# Patient Record
Sex: MALE | Race: WHITE | NOT HISPANIC OR LATINO | Employment: OTHER | URBAN - NONMETROPOLITAN AREA
[De-identification: names, ages, dates, MRNs, and addresses within clinical notes are randomized per-mention and may not be internally consistent; named-entity substitution may affect disease eponyms.]

---

## 2023-02-14 PROBLEM — R94.31 ABNORMAL EKG: Status: ACTIVE | Noted: 2023-02-14

## 2023-02-14 PROBLEM — J30.1 ALLERGIC RHINITIS DUE TO POLLEN: Status: ACTIVE | Noted: 2023-02-14

## 2023-02-14 PROBLEM — I71.21 ASCENDING AORTIC ANEURYSM (CMS-HCC): Status: ACTIVE | Noted: 2023-02-14

## 2023-02-14 PROBLEM — C61 PROSTATE CANCER (MULTI): Status: ACTIVE | Noted: 2023-02-14

## 2023-02-14 PROBLEM — R03.0 ELEVATED BLOOD PRESSURE, SITUATIONAL: Status: ACTIVE | Noted: 2023-02-14

## 2023-02-14 PROBLEM — D71 GRANULOMATOUS DISEASE (MULTI): Status: ACTIVE | Noted: 2023-02-14

## 2023-02-14 PROBLEM — R97.20 PSA ELEVATION: Status: ACTIVE | Noted: 2023-02-14

## 2023-02-14 PROBLEM — J30.9 ALLERGIC RHINITIS: Status: ACTIVE | Noted: 2023-02-14

## 2023-02-14 PROBLEM — M50.30 DEGENERATIVE DISC DISEASE, CERVICAL: Status: ACTIVE | Noted: 2023-02-14

## 2023-02-14 PROBLEM — Z86.0100 HISTORY OF COLON POLYPS: Status: ACTIVE | Noted: 2023-02-14

## 2023-02-14 PROBLEM — S76.011A STRAIN OF HIP FLEXOR, RIGHT, INITIAL ENCOUNTER: Status: ACTIVE | Noted: 2023-02-14

## 2023-02-14 PROBLEM — J45.909 AIRWAY HYPERREACTIVITY (HHS-HCC): Status: ACTIVE | Noted: 2023-02-14

## 2023-02-14 PROBLEM — S93.402A LEFT ANKLE SPRAIN: Status: ACTIVE | Noted: 2023-02-14

## 2023-02-14 PROBLEM — M16.11 ARTHRITIS OF RIGHT HIP: Status: ACTIVE | Noted: 2023-02-14

## 2023-02-14 PROBLEM — M25.551 HIP PAIN, RIGHT: Status: ACTIVE | Noted: 2023-02-14

## 2023-02-14 PROBLEM — R91.1 LUNG NODULE: Status: ACTIVE | Noted: 2023-02-14

## 2023-02-14 PROBLEM — E78.5 HYPERLIPIDEMIA: Status: ACTIVE | Noted: 2023-02-14

## 2023-02-14 PROBLEM — J98.4 RESTRICTIVE LUNG DISEASE: Status: ACTIVE | Noted: 2023-02-14

## 2023-02-14 PROBLEM — F41.9 ANXIETY: Status: ACTIVE | Noted: 2023-02-14

## 2023-02-14 PROBLEM — S61.412A LACERATION OF LEFT HAND WITHOUT FOREIGN BODY: Status: ACTIVE | Noted: 2023-02-14

## 2023-02-14 PROBLEM — Z86.010 HISTORY OF COLON POLYPS: Status: ACTIVE | Noted: 2023-02-14

## 2023-02-14 PROBLEM — M48.03 SPINAL STENOSIS OF CERVICOTHORACIC REGION: Status: ACTIVE | Noted: 2023-02-14

## 2023-02-14 RX ORDER — MONTELUKAST SODIUM 10 MG/1
10 TABLET ORAL
COMMUNITY
Start: 2022-05-25 | End: 2023-11-13 | Stop reason: WASHOUT

## 2023-02-14 RX ORDER — ATORVASTATIN CALCIUM 40 MG/1
40 TABLET, FILM COATED ORAL
COMMUNITY
Start: 2013-03-25 | End: 2023-08-07 | Stop reason: SDUPTHER

## 2023-02-14 RX ORDER — PAROXETINE HYDROCHLORIDE 20 MG/1
20 TABLET, FILM COATED ORAL
COMMUNITY
Start: 2013-03-25 | End: 2023-08-07 | Stop reason: SDUPTHER

## 2023-02-14 RX ORDER — CEPHALEXIN 500 MG/1
500 CAPSULE ORAL 3 TIMES DAILY
COMMUNITY
Start: 2022-07-21 | End: 2023-04-01

## 2023-02-14 RX ORDER — ALBUTEROL SULFATE 90 UG/1
2 AEROSOL, METERED RESPIRATORY (INHALATION) EVERY 4 HOURS PRN
COMMUNITY
Start: 2022-05-25

## 2023-02-14 RX ORDER — OMEGA-3-ACID ETHYL ESTERS 1 G/1
2 CAPSULE, LIQUID FILLED ORAL 2 TIMES DAILY
COMMUNITY
Start: 2013-04-24 | End: 2023-05-23

## 2023-02-14 RX ORDER — CHOLECALCIFEROL (VITAMIN D3) 25 MCG
1000 TABLET ORAL
COMMUNITY

## 2023-04-01 ENCOUNTER — OFFICE VISIT (OUTPATIENT)
Dept: PRIMARY CARE | Facility: CLINIC | Age: 73
End: 2023-04-01
Payer: MEDICARE

## 2023-04-01 VITALS
OXYGEN SATURATION: 98 % | TEMPERATURE: 97.4 F | WEIGHT: 182.3 LBS | BODY MASS INDEX: 27.72 KG/M2 | DIASTOLIC BLOOD PRESSURE: 68 MMHG | SYSTOLIC BLOOD PRESSURE: 144 MMHG | HEART RATE: 80 BPM

## 2023-04-01 DIAGNOSIS — C61 PROSTATE CANCER (MULTI): ICD-10-CM

## 2023-04-01 DIAGNOSIS — F41.9 ANXIETY: ICD-10-CM

## 2023-04-01 DIAGNOSIS — J30.1 ALLERGIC RHINITIS DUE TO POLLEN, UNSPECIFIED SEASONALITY: ICD-10-CM

## 2023-04-01 DIAGNOSIS — E78.2 MIXED HYPERLIPIDEMIA: ICD-10-CM

## 2023-04-01 DIAGNOSIS — D71 GRANULOMATOUS DISEASE (MULTI): ICD-10-CM

## 2023-04-01 DIAGNOSIS — Z00.00 MEDICARE ANNUAL WELLNESS VISIT, SUBSEQUENT: Primary | ICD-10-CM

## 2023-04-01 DIAGNOSIS — I71.21 ANEURYSM OF ASCENDING AORTA WITHOUT RUPTURE (CMS-HCC): ICD-10-CM

## 2023-04-01 DIAGNOSIS — R03.0 ELEVATED BLOOD PRESSURE, SITUATIONAL: ICD-10-CM

## 2023-04-01 PROCEDURE — 1160F RVW MEDS BY RX/DR IN RCRD: CPT | Performed by: FAMILY MEDICINE

## 2023-04-01 PROCEDURE — 3077F SYST BP >= 140 MM HG: CPT | Performed by: FAMILY MEDICINE

## 2023-04-01 PROCEDURE — 3078F DIAST BP <80 MM HG: CPT | Performed by: FAMILY MEDICINE

## 2023-04-01 PROCEDURE — 1036F TOBACCO NON-USER: CPT | Performed by: FAMILY MEDICINE

## 2023-04-01 PROCEDURE — 99214 OFFICE O/P EST MOD 30 MIN: CPT | Performed by: FAMILY MEDICINE

## 2023-04-01 PROCEDURE — 1159F MED LIST DOCD IN RCRD: CPT | Performed by: FAMILY MEDICINE

## 2023-04-01 ASSESSMENT — ANXIETY QUESTIONNAIRES
4. TROUBLE RELAXING: NOT AT ALL
GAD7 TOTAL SCORE: 0
IF YOU CHECKED OFF ANY PROBLEMS ON THIS QUESTIONNAIRE, HOW DIFFICULT HAVE THESE PROBLEMS MADE IT FOR YOU TO DO YOUR WORK, TAKE CARE OF THINGS AT HOME, OR GET ALONG WITH OTHER PEOPLE: NOT DIFFICULT AT ALL
5. BEING SO RESTLESS THAT IT IS HARD TO SIT STILL: NOT AT ALL
3. WORRYING TOO MUCH ABOUT DIFFERENT THINGS: NOT AT ALL
7. FEELING AFRAID AS IF SOMETHING AWFUL MIGHT HAPPEN: NOT AT ALL
2. NOT BEING ABLE TO STOP OR CONTROL WORRYING: NOT AT ALL
6. BECOMING EASILY ANNOYED OR IRRITABLE: NOT AT ALL
1. FEELING NERVOUS, ANXIOUS, OR ON EDGE: NOT AT ALL

## 2023-04-01 ASSESSMENT — PATIENT HEALTH QUESTIONNAIRE - PHQ9
SUM OF ALL RESPONSES TO PHQ9 QUESTIONS 1 & 2: 0
2. FEELING DOWN, DEPRESSED OR HOPELESS: NOT AT ALL
1. LITTLE INTEREST OR PLEASURE IN DOING THINGS: NOT AT ALL

## 2023-04-01 NOTE — PATIENT INSTRUCTIONS
1.  Elevated cholesterol    Treatment plan continue on atorvastatin.  In addition continue eating a heart healthy diet rich with fresh fruit fresh vegetables lean proteins avoiding simple sugars and fast foods.  Continue with your excellent exercise routine.    We will check kidney function liver function blood sugar and cholesterol with your next set of labs and call you with those results.  By lowering your cholesterol we are reducing your risk for heart attack and stroke and were also preventing aneurysm from getting larger    2.  Thoracic aortic aneurysm.  Your recent CT scan did indicate your aorta at 4.1.  We will continue to monitor annually.  Please check blood pressures at home.  Blood pressure morning 1 evening for 2 weeks normal.  Goals would be systolic top number consistently less than 140 preferably less than 130 bottom number diastolic consistently less than 80 by keeping the blood pressure down or preventing aneurysm from getting larger.    3.  Situational anxiety/depressed mood.  Symptoms are very well controlled on current doses of Paxil please continue on current doses of paroxetine and continue with a healthy relationship with your family and friends.  Try to be outside for 30 minutes to Sunlight every day.  Continue with sleep prior to sleep at night.  And continue with daily pressure medication.  If you think your moods are changing or worsening please call me    4.  Prostate cancer.  We will continue to monitor your PSA after your prostate cancer treatment    5.  If you otherwise stay healthy and labs are normal we should do a Medicare wellness in 6 months but am happy to see you sooner if needed

## 2023-04-05 NOTE — PROGRESS NOTES
Subjective   Jai Mcneill is a 73 y.o. male who presents for Follow-up (6 month follow up htn, chol, discuss mri, ekg, aortic aneurysm /).  ARMIDA Vergara is here today for follow-up.  We had a good discussion regards to recent CT scan.  We discussed the results in detail.    He currently exercises on a regular basis denying any chest pain chest pressure chest tightness with activities.    He continues to take atorvastatin to help lower cholesterol.  And he takes Lovaza to help lower triglycerides.  He denies side effects.  He continues to try to eat a healthy diet.    He continues to take Paxil to help with overall mood.  He denies any feelings of anxiety or depression.  He continues to use albuterol as needed as well as Singulair to help reduce sinus drainage and coughing.      Review of Systems   All other systems reviewed and are negative.      Objective   /68 (BP Location: Right arm, Patient Position: Sitting, BP Cuff Size: Adult)   Pulse 80   Temp 36.3 °C (97.4 °F) (Temporal)   Wt 82.7 kg (182 lb 4.8 oz)   SpO2 98%   BMI 27.72 kg/m²    Physical Exam  Vitals reviewed.   Constitutional:       Appearance: Normal appearance. He is normal weight.   HENT:      Head: Normocephalic and atraumatic.      Right Ear: Tympanic membrane, ear canal and external ear normal.      Left Ear: Tympanic membrane, ear canal and external ear normal.      Nose: Nose normal.      Mouth/Throat:      Mouth: Mucous membranes are moist.      Pharynx: Oropharynx is clear.   Eyes:      Extraocular Movements: Extraocular movements intact.      Conjunctiva/sclera: Conjunctivae normal.      Pupils: Pupils are equal, round, and reactive to light.   Neck:      Vascular: No carotid bruit.   Cardiovascular:      Rate and Rhythm: Normal rate and regular rhythm.      Pulses: Normal pulses.      Heart sounds: Normal heart sounds. No murmur heard.  Pulmonary:      Effort: Pulmonary effort is normal. No respiratory distress.      Breath  sounds: Normal breath sounds. No wheezing, rhonchi or rales.   Abdominal:      General: Abdomen is flat. Bowel sounds are normal.      Palpations: Abdomen is soft. There is no mass.      Tenderness: There is no abdominal tenderness. There is no guarding.   Musculoskeletal:         General: No swelling or deformity. Normal range of motion.      Cervical back: Normal range of motion and neck supple.      Right lower leg: No edema.      Left lower leg: No edema.   Lymphadenopathy:      Cervical: No cervical adenopathy.   Skin:     General: Skin is warm and dry.      Capillary Refill: Capillary refill takes less than 2 seconds.   Neurological:      General: No focal deficit present.      Mental Status: He is alert and oriented to person, place, and time.   Psychiatric:         Mood and Affect: Mood normal.         Behavior: Behavior normal.         Thought Content: Thought content normal.         Judgment: Judgment normal.         Assessment/Plan   1.  Elevated cholesterol    Treatment plan continue on atorvastatin.  In addition continue eating a heart healthy diet rich with fresh fruit fresh vegetables lean proteins avoiding simple sugars and fast foods.  Continue with your excellent exercise routine.    We will check kidney function liver function blood sugar and cholesterol with your next set of labs and call you with those results.  By lowering your cholesterol we are reducing your risk for heart attack and stroke and were also preventing aneurysm from getting larger    2.  Thoracic aortic aneurysm.  Your recent CT scan did indicate your aorta at 4.1.  We will continue to monitor annually.  Please check blood pressures at home.  Blood pressure morning 1 evening for 2 weeks normal.  Goals would be systolic top number consistently less than 140 preferably less than 130 bottom number diastolic consistently less than 80 by keeping the blood pressure down or preventing aneurysm from getting larger.    3.  Situational  anxiety/depressed mood.  Symptoms are very well controlled on current doses of Paxil please continue on current doses of paroxetine and continue with a healthy relationship with your family and friends.  Try to be outside for 30 minutes to Sunlight every day.  Continue with sleep prior to sleep at night.  And continue with daily pressure medication.  If you think your moods are changing or worsening please call me    4.  Prostate cancer.  We will continue to monitor your PSA after your prostate cancer treatment    5.  If you otherwise stay healthy and labs are normal we should do a Medicare wellness in 6 months but am happy to see you sooner if needed      Problem List Items Addressed This Visit          Circulatory    Ascending aortic aneurysm (CMS/HCC)    Elevated blood pressure, situational       Genitourinary    Prostate cancer (CMS/HCC)    Relevant Orders    PSA       Infectious/Inflammatory    Granulomatous disease (CMS/HCC)       Other    Allergic rhinitis due to pollen    Anxiety    Hyperlipidemia    Relevant Orders    Comprehensive Metabolic Panel    Lipid Panel     Other Visit Diagnoses       Medicare annual wellness visit, subsequent    -  Primary    Relevant Orders    Follow Up In [facility] - PCP

## 2023-05-23 DIAGNOSIS — E78.5 HYPERLIPIDEMIA, UNSPECIFIED HYPERLIPIDEMIA TYPE: ICD-10-CM

## 2023-05-23 RX ORDER — OMEGA-3-ACID ETHYL ESTERS 1 G/1
CAPSULE, LIQUID FILLED ORAL
Qty: 360 CAPSULE | Refills: 0 | Status: SHIPPED | OUTPATIENT
Start: 2023-05-23 | End: 2023-11-27 | Stop reason: SDUPTHER

## 2023-08-07 DIAGNOSIS — F41.9 ANXIETY AND DEPRESSION: ICD-10-CM

## 2023-08-07 DIAGNOSIS — E78.5 HYPERLIPIDEMIA, UNSPECIFIED HYPERLIPIDEMIA TYPE: ICD-10-CM

## 2023-08-07 DIAGNOSIS — F32.A ANXIETY AND DEPRESSION: ICD-10-CM

## 2023-08-07 RX ORDER — PAROXETINE HYDROCHLORIDE 20 MG/1
20 TABLET, FILM COATED ORAL
Qty: 90 TABLET | Refills: 3 | Status: SHIPPED | OUTPATIENT
Start: 2023-08-07

## 2023-08-07 RX ORDER — ATORVASTATIN CALCIUM 40 MG/1
40 TABLET, FILM COATED ORAL
Qty: 90 TABLET | Refills: 3 | Status: SHIPPED | OUTPATIENT
Start: 2023-08-07

## 2023-10-07 ENCOUNTER — APPOINTMENT (OUTPATIENT)
Dept: PRIMARY CARE | Facility: CLINIC | Age: 73
End: 2023-10-07
Payer: MEDICARE

## 2023-11-13 ENCOUNTER — OFFICE VISIT (OUTPATIENT)
Dept: PRIMARY CARE | Facility: CLINIC | Age: 73
End: 2023-11-13
Payer: MEDICARE

## 2023-11-13 VITALS
OXYGEN SATURATION: 98 % | HEIGHT: 70 IN | DIASTOLIC BLOOD PRESSURE: 76 MMHG | SYSTOLIC BLOOD PRESSURE: 148 MMHG | HEART RATE: 60 BPM | TEMPERATURE: 97 F | WEIGHT: 181.1 LBS | BODY MASS INDEX: 25.93 KG/M2

## 2023-11-13 DIAGNOSIS — E55.9 VITAMIN D DEFICIENCY: ICD-10-CM

## 2023-11-13 DIAGNOSIS — I71.21 ANEURYSM OF ASCENDING AORTA WITHOUT RUPTURE (CMS-HCC): ICD-10-CM

## 2023-11-13 DIAGNOSIS — M50.30 DEGENERATIVE DISC DISEASE, CERVICAL: ICD-10-CM

## 2023-11-13 DIAGNOSIS — C61 PROSTATE CANCER (MULTI): ICD-10-CM

## 2023-11-13 DIAGNOSIS — J30.89 ALLERGIC RHINITIS DUE TO OTHER ALLERGIC TRIGGER, UNSPECIFIED SEASONALITY: ICD-10-CM

## 2023-11-13 DIAGNOSIS — Z12.11 ENCOUNTER FOR SCREENING FOR MALIGNANT NEOPLASM OF COLON: ICD-10-CM

## 2023-11-13 DIAGNOSIS — R03.0 ELEVATED BLOOD PRESSURE, SITUATIONAL: ICD-10-CM

## 2023-11-13 DIAGNOSIS — Z23 IMMUNIZATION DUE: ICD-10-CM

## 2023-11-13 DIAGNOSIS — E78.5 HYPERLIPIDEMIA, UNSPECIFIED HYPERLIPIDEMIA TYPE: ICD-10-CM

## 2023-11-13 DIAGNOSIS — D64.9 ANEMIA, UNSPECIFIED TYPE: ICD-10-CM

## 2023-11-13 DIAGNOSIS — F41.9 ANXIETY: ICD-10-CM

## 2023-11-13 DIAGNOSIS — Z00.00 MEDICARE ANNUAL WELLNESS VISIT, SUBSEQUENT: Primary | ICD-10-CM

## 2023-11-13 PROCEDURE — G0009 ADMIN PNEUMOCOCCAL VACCINE: HCPCS | Performed by: FAMILY MEDICINE

## 2023-11-13 PROCEDURE — 3078F DIAST BP <80 MM HG: CPT | Performed by: FAMILY MEDICINE

## 2023-11-13 PROCEDURE — 1170F FXNL STATUS ASSESSED: CPT | Performed by: FAMILY MEDICINE

## 2023-11-13 PROCEDURE — 1160F RVW MEDS BY RX/DR IN RCRD: CPT | Performed by: FAMILY MEDICINE

## 2023-11-13 PROCEDURE — 99214 OFFICE O/P EST MOD 30 MIN: CPT | Performed by: FAMILY MEDICINE

## 2023-11-13 PROCEDURE — 1159F MED LIST DOCD IN RCRD: CPT | Performed by: FAMILY MEDICINE

## 2023-11-13 PROCEDURE — 1036F TOBACCO NON-USER: CPT | Performed by: FAMILY MEDICINE

## 2023-11-13 PROCEDURE — G0439 PPPS, SUBSEQ VISIT: HCPCS | Performed by: FAMILY MEDICINE

## 2023-11-13 PROCEDURE — 3077F SYST BP >= 140 MM HG: CPT | Performed by: FAMILY MEDICINE

## 2023-11-13 PROCEDURE — 90677 PCV20 VACCINE IM: CPT | Performed by: FAMILY MEDICINE

## 2023-11-13 PROCEDURE — 1125F AMNT PAIN NOTED PAIN PRSNT: CPT | Performed by: FAMILY MEDICINE

## 2023-11-13 ASSESSMENT — ENCOUNTER SYMPTOMS
NEUROLOGICAL NEGATIVE: 1
ENDOCRINE NEGATIVE: 1
CONSTITUTIONAL NEGATIVE: 1
GASTROINTESTINAL NEGATIVE: 1
HEMATOLOGIC/LYMPHATIC NEGATIVE: 1
MUSCULOSKELETAL NEGATIVE: 1
PSYCHIATRIC NEGATIVE: 1
EYES NEGATIVE: 1
CARDIOVASCULAR NEGATIVE: 1
RESPIRATORY NEGATIVE: 1

## 2023-11-13 ASSESSMENT — ACTIVITIES OF DAILY LIVING (ADL)
BATHING: INDEPENDENT
TAKING_MEDICATION: INDEPENDENT
GROCERY_SHOPPING: INDEPENDENT
MANAGING_FINANCES: INDEPENDENT
DOING_HOUSEWORK: INDEPENDENT
DRESSING: INDEPENDENT

## 2023-11-13 NOTE — PATIENT INSTRUCTIONS
1.  Medicare wellness visit    Today in the office you had your annual Medicare wellness visit    You are up-to-date with your vaccines you received a Prevnar 20 pneumonia vaccine today    Please keep eating a heart healthy diet a good goal is 5-7 servings of fresh fruit and vegetable every day along with lean protein avoiding simple sugars and fast foods    Continue with your excellent exercise routine exercising 30 to 60 minutes 5 days a week is ideal.  Certainly would call if any chest pain chest pressure chest tightness with your activities    Continue on the atorvastatin 40 mg a day and your omega-3 fish oil by lowering your cholesterol and your triglycerides you are reducing risk for heart attack and stroke    With your next set of labs we will check kidney function liver function blood sugar cholesterol we will also check a PSA with your history of prostate cancer.  In my opinion it is safe to follow your PSAs providing that your urinary symptoms do not change dramatically if you start having obstructive symptoms such as you cannot urinate this could be a reason to contact me or the urologist.  Certainly it is reasonable to continue following with the urologist if you would like with your history of prostate disease    Continue on the paroxetine 20 mg a day to help with your overall mood continue getting at least 6 hours of sleep at night.  Continue getting outside for least 30 minutes and natural sunlight every day.  Continue with a healthy relationship with family and friends.  And continue with daily prayer meditation if you feel like your moods are changing or worsening please call  You had a severe reaction to bee stings.  Please carry Benadryl at all times.  Due to the severity of the reaction I am recommending you carry an EpiPen if you start having shortness of breath lightheadedness you feel like you are going to pass out please give yourself an EpiPen or have someone around you get the EpiPen.  If  you get the EpiPen you must call 911 is the EpiPen will wear off and you could go back in anaphylaxis.  If you would like to meet with one of our allergist to determine the type of bee you are allergic to and if you would be eligible treatment for your allergy please let me know.  If you otherwise stay healthy I would like to see him back in 6 months to repeat but am happy to see sooner if needed

## 2023-11-13 NOTE — PROGRESS NOTES
"Subjective   Patient ID: Jai Mcneill is a 73 y.o. male who presents for Medicare Annual Wellness Visit Subsequent ([facility]).    HPI     The patient reports needing allergy medication for hiking. The patient is worried about allergy to bug bites and bee stings. The patient is wondering if an epipen is reasonable to have for allergic reactions to these insects.     The patient had an aneurysm of the aorta in 02/2023. Since then the patient has not followed up since then and is wondering when he should follow up.    Hyperlipidemia: The patient condition is stable. The patient is currently compliant with their current medication regimen and are tolerating it appropriately. There are no further concerns at this time.    The patient denies any changes in vision, hearing or dental.     The patient maintains they do not have any chest pain, chest tightness or shortness of breath.    They do not experience nausea, emesis, changes in bowel movements or dyspepsia.    Review of Systems   Constitutional: Negative.    HENT: Negative.     Eyes: Negative.    Respiratory: Negative.     Cardiovascular: Negative.    Gastrointestinal: Negative.    Endocrine: Negative.    Genitourinary: Negative.    Musculoskeletal: Negative.    Skin: Negative.    Allergic/Immunologic: Positive for environmental allergies.   Neurological: Negative.    Hematological: Negative.    Psychiatric/Behavioral: Negative.         Objective   /76 (BP Location: Right arm, Patient Position: Sitting, BP Cuff Size: Adult)   Pulse 60   Temp 36.1 °C (97 °F) (Temporal)   Ht 1.772 m (5' 9.75\")   Wt 82.1 kg (181 lb 1.6 oz)   SpO2 98%   BMI 26.17 kg/m²     Physical Exam  Constitutional:       Appearance: Normal appearance.   HENT:      Head: Normocephalic and atraumatic.      Nose: Nose normal.   Eyes:      Extraocular Movements: Extraocular movements intact.      Conjunctiva/sclera: Conjunctivae normal.      Pupils: Pupils are equal, round, and reactive to " light.   Cardiovascular:      Rate and Rhythm: Normal rate and regular rhythm.      Pulses: Normal pulses.      Heart sounds: Normal heart sounds.   Pulmonary:      Effort: Pulmonary effort is normal.      Breath sounds: Normal breath sounds and air entry.   Abdominal:      General: Bowel sounds are normal.      Palpations: Abdomen is soft.   Musculoskeletal:         General: Normal range of motion.      Cervical back: Normal range of motion.   Neurological:      Mental Status: He is alert.   Psychiatric:         Mood and Affect: Mood normal.         Behavior: Behavior normal.         Thought Content: Thought content normal.         Judgment: Judgment normal.         Assessment/Plan   Problem List Items Addressed This Visit             ICD-10-CM    Allergic rhinitis J30.9    Anxiety F41.9    Ascending aortic aneurysm (CMS/[facility]) I71.21    Degenerative disc disease, cervical M50.30    Elevated blood pressure, situational R03.0    Hyperlipidemia E78.5    Prostate cancer (CMS/[facility]) C61    Relevant Orders    CBC and Auto Differential    PSA    Medicare annual wellness visit, subsequent - Primary Z00.00    Relevant Orders    CBC and Auto Differential    Tsh With Reflex To Free T4 If Abnormal    Hemoglobin A1c    Vitamin D 25-Hydroxy,Total (for eval of Vitamin D levels)    Follow Up In [facility] - PCP - Health Maintenance    Anemia D64.9    Relevant Orders    CBC and Auto Differential    Vitamin D deficiency E55.9    Relevant Orders    Vitamin D 25-Hydroxy,Total (for eval of Vitamin D levels)     Other Visit Diagnoses         Codes    Encounter for screening for malignant neoplasm of colon     Z12.11    Relevant Orders    Colonoscopy Screening; Average Risk Patient    Immunization due     Z23    Relevant Orders    Pneumococcal conjugate vaccine, 20-valent (PREVNAR 20) (Completed)          1. Medicare annual wellness visit, subsequent  CBC and Auto Differential    Tsh With Reflex To Free T4 If Abnormal     Hemoglobin A1c    Vitamin D 25-Hydroxy,Total (for eval of Vitamin D levels)    Follow Up In [facility] - PCP    Follow Up In [facility] - PCP - Health Maintenance      2. Encounter for screening for malignant neoplasm of colon  Colonoscopy Screening; Average Risk Patient      3. Hyperlipidemia, unspecified hyperlipidemia type        4. Immunization due  Pneumococcal conjugate vaccine, 20-valent (PREVNAR 20)      5. Anemia, unspecified type  CBC and Auto Differential      6. Vitamin D deficiency  Vitamin D 25-Hydroxy,Total (for eval of Vitamin D levels)      7. Prostate cancer (CMS/HCC)  CBC and Auto Differential    PSA      8. Elevated blood pressure, situational        9. Aneurysm of ascending aorta without rupture (CMS/HCC)        10. Allergic rhinitis due to other allergic trigger, unspecified seasonality        11. Anxiety        12. Degenerative disc disease, cervical          1.  Medicare wellness visit    Today in the office you had your annual Medicare wellness visit    You are up-to-date with your vaccines you received a Prevnar 20 pneumonia vaccine today    Please keep eating a heart healthy diet a good goal is 5-7 servings of fresh fruit and vegetable every day along with lean protein avoiding simple sugars and fast foods    Continue with your excellent exercise routine exercising 30 to 60 minutes 5 days a week is ideal.  Certainly would call if any chest pain chest pressure chest tightness with your activities    Continue on the atorvastatin 40 mg a day and your omega-3 fish oil by lowering your cholesterol and your triglycerides you are reducing risk for heart attack and stroke    With your next set of labs we will check kidney function liver function blood sugar cholesterol we will also check a PSA with your history of prostate cancer.  In my opinion it is safe to follow your PSAs providing that your urinary symptoms do not change dramatically if you start having obstructive  symptoms such as you cannot urinate this could be a reason to contact me or the urologist.  Certainly it is reasonable to continue following with the urologist if you would like with your history of prostate disease    Continue on the paroxetine 20 mg a day to help with your overall mood continue getting at least 6 hours of sleep at night.  Continue getting outside for least 30 minutes and natural sunlight every day.  Continue with a healthy relationship with family and friends.  And continue with daily prayer meditation if you feel like your moods are changing or worsening please call  You had a severe reaction to bee stings.  Please carry Benadryl at all times.  Due to the severity of the reaction I am recommending you carry an EpiPen if you start having shortness of breath lightheadedness you feel like you are going to pass out please give yourself an EpiPen or have someone around you get the EpiPen.  If you get the EpiPen you must call 911 is the EpiPen will wear off and you could go back in anaphylaxis.  If you would like to meet with one of our allergist to determine the type of bee you are allergic to and if you would be eligible treatment for your allergy please let me know.  If you otherwise stay healthy I would like to see him back in 6 months to repeat but am happy to see sooner if needed     Follow-up in 6 months or sooner if there are any concerns.    Scribe Attestation  By signing my name below, Louise VANG, Noe   attest that this documentation has been prepared under the direction and in the presence of Nabeel Elliott MD on 11/13/2023 at1:50pm EST.

## 2023-11-15 PROBLEM — D64.9 ANEMIA: Status: ACTIVE | Noted: 2023-11-15

## 2023-11-15 PROBLEM — E55.9 VITAMIN D DEFICIENCY: Status: ACTIVE | Noted: 2023-11-15

## 2023-11-15 PROBLEM — Z00.00 MEDICARE ANNUAL WELLNESS VISIT, SUBSEQUENT: Status: ACTIVE | Noted: 2023-11-15

## 2023-11-27 DIAGNOSIS — E78.5 HYPERLIPIDEMIA, UNSPECIFIED HYPERLIPIDEMIA TYPE: ICD-10-CM

## 2023-11-27 RX ORDER — OMEGA-3-ACID ETHYL ESTERS 1 G/1
2 CAPSULE, LIQUID FILLED ORAL 2 TIMES DAILY
Qty: 360 CAPSULE | Refills: 3 | Status: SHIPPED | OUTPATIENT
Start: 2023-11-27

## 2023-12-04 ENCOUNTER — TELEPHONE (OUTPATIENT)
Dept: PRIMARY CARE | Facility: CLINIC | Age: 73
End: 2023-12-04
Payer: MEDICARE

## 2023-12-04 DIAGNOSIS — U07.1 COVID-19: Primary | ICD-10-CM

## 2023-12-04 NOTE — TELEPHONE ENCOUNTER
Onset of Covid illness- 12/1  Date & Location of positive covid 19 test- 12/4 at home  (If negative test please list details of exposure and why requesting MOAB treatment )  Current pregnancy -  no  Vaccinated for covid 19- yes up to date on all  Current Symptoms  - body aches, congestion, coughing  Temperature- did not take, does feel warm      I have asked patient and they report they have NO increased SOB, No CP, No confusion, No change in skin color ( blue or gray).  Next available virtual opening is - 12/5    Provider if you can please review patient's chart and medical history and provide detailed instructions on next steps in this task.   Thank you    Bree in [place] (remove from chart after message since this would only be for antiviral medication not routine pharmacy)

## 2023-12-04 NOTE — TELEPHONE ENCOUNTER
I spoke with the patient on the phone.  I prescribed Paxlovid.  He is aware he needs to stop the atorvastatin while taking Paxlovid.  He will push fluids.  Contact me if he worsens.

## 2024-05-13 ENCOUNTER — APPOINTMENT (OUTPATIENT)
Dept: PRIMARY CARE | Facility: CLINIC | Age: 74
End: 2024-05-13
Payer: MEDICARE

## 2024-07-03 ASSESSMENT — PROMIS GLOBAL HEALTH SCALE
CARRYOUT_SOCIAL_ACTIVITIES: EXCELLENT
RATE_MENTAL_HEALTH: EXCELLENT
RATE_AVERAGE_PAIN: 0
RATE_PHYSICAL_HEALTH: VERY GOOD
RATE_AVERAGE_FATIGUE: MILD
EMOTIONAL_PROBLEMS: NEVER
RATE_QUALITY_OF_LIFE: EXCELLENT
RATE_GENERAL_HEALTH: VERY GOOD
RATE_SOCIAL_SATISFACTION: EXCELLENT
CARRYOUT_PHYSICAL_ACTIVITIES: COMPLETELY

## 2024-07-05 ENCOUNTER — APPOINTMENT (OUTPATIENT)
Dept: PRIMARY CARE | Facility: CLINIC | Age: 74
End: 2024-07-05
Payer: MEDICARE

## 2024-07-05 VITALS
OXYGEN SATURATION: 97 % | HEART RATE: 75 BPM | TEMPERATURE: 97.5 F | BODY MASS INDEX: 25.86 KG/M2 | HEIGHT: 70 IN | SYSTOLIC BLOOD PRESSURE: 138 MMHG | DIASTOLIC BLOOD PRESSURE: 80 MMHG | WEIGHT: 180.6 LBS

## 2024-07-05 DIAGNOSIS — Z00.00 MEDICARE ANNUAL WELLNESS VISIT, SUBSEQUENT: ICD-10-CM

## 2024-07-05 DIAGNOSIS — F41.9 ANXIETY AND DEPRESSION: ICD-10-CM

## 2024-07-05 DIAGNOSIS — F32.A ANXIETY AND DEPRESSION: ICD-10-CM

## 2024-07-05 DIAGNOSIS — R03.0 ELEVATED BLOOD PRESSURE, SITUATIONAL: Primary | ICD-10-CM

## 2024-07-05 DIAGNOSIS — D71 GRANULOMATOUS DISEASE (MULTI): ICD-10-CM

## 2024-07-05 DIAGNOSIS — I71.21 ANEURYSM OF ASCENDING AORTA WITHOUT RUPTURE (CMS-HCC): ICD-10-CM

## 2024-07-05 DIAGNOSIS — C61 PROSTATE CANCER (MULTI): ICD-10-CM

## 2024-07-05 DIAGNOSIS — E78.5 HYPERLIPIDEMIA, UNSPECIFIED HYPERLIPIDEMIA TYPE: ICD-10-CM

## 2024-07-05 PROCEDURE — 1170F FXNL STATUS ASSESSED: CPT | Performed by: FAMILY MEDICINE

## 2024-07-05 PROCEDURE — 1159F MED LIST DOCD IN RCRD: CPT | Performed by: FAMILY MEDICINE

## 2024-07-05 PROCEDURE — 1160F RVW MEDS BY RX/DR IN RCRD: CPT | Performed by: FAMILY MEDICINE

## 2024-07-05 PROCEDURE — 3078F DIAST BP <80 MM HG: CPT | Performed by: FAMILY MEDICINE

## 2024-07-05 PROCEDURE — 1123F ACP DISCUSS/DSCN MKR DOCD: CPT | Performed by: FAMILY MEDICINE

## 2024-07-05 PROCEDURE — 3075F SYST BP GE 130 - 139MM HG: CPT | Performed by: FAMILY MEDICINE

## 2024-07-05 PROCEDURE — 99214 OFFICE O/P EST MOD 30 MIN: CPT | Performed by: FAMILY MEDICINE

## 2024-07-05 RX ORDER — ATORVASTATIN CALCIUM 40 MG/1
40 TABLET, FILM COATED ORAL
Qty: 90 TABLET | Refills: 3 | Status: SHIPPED | OUTPATIENT
Start: 2024-07-05

## 2024-07-05 RX ORDER — PAROXETINE HYDROCHLORIDE 20 MG/1
20 TABLET, FILM COATED ORAL
Qty: 90 TABLET | Refills: 3 | Status: SHIPPED | OUTPATIENT
Start: 2024-07-05

## 2024-07-05 RX ORDER — CYANOCOBALAMIN (VITAMIN B-12) 250 MCG
250 TABLET ORAL DAILY
COMMUNITY

## 2024-07-05 RX ORDER — MV-MN/LUTEIN/ZEAX/BILBER/HB277 5-1-7.5 MG
CAPSULE ORAL
COMMUNITY

## 2024-07-05 ASSESSMENT — ENCOUNTER SYMPTOMS
CONSTIPATION: 0
EYES NEGATIVE: 1
ENDOCRINE NEGATIVE: 1
DIARRHEA: 0
GASTROINTESTINAL NEGATIVE: 1
ALLERGIC/IMMUNOLOGIC NEGATIVE: 1
NEUROLOGICAL NEGATIVE: 1
MUSCULOSKELETAL NEGATIVE: 1
CHEST TIGHTNESS: 0
SHORTNESS OF BREATH: 0
PSYCHIATRIC NEGATIVE: 1
RESPIRATORY NEGATIVE: 1
NAUSEA: 0
VOMITING: 0
CARDIOVASCULAR NEGATIVE: 1
HEMATOLOGIC/LYMPHATIC NEGATIVE: 1
CONSTITUTIONAL NEGATIVE: 1

## 2024-07-05 ASSESSMENT — PATIENT HEALTH QUESTIONNAIRE - PHQ9
2. FEELING DOWN, DEPRESSED OR HOPELESS: NOT AT ALL
1. LITTLE INTEREST OR PLEASURE IN DOING THINGS: NOT AT ALL
SUM OF ALL RESPONSES TO PHQ9 QUESTIONS 1 AND 2: 0

## 2024-07-05 ASSESSMENT — ACTIVITIES OF DAILY LIVING (ADL)
TAKING_MEDICATION: INDEPENDENT
DOING_HOUSEWORK: INDEPENDENT
GROCERY_SHOPPING: INDEPENDENT
BATHING: INDEPENDENT
MANAGING_FINANCES: INDEPENDENT
DRESSING: INDEPENDENT

## 2024-07-05 NOTE — PROGRESS NOTES
"Subjective   Patient ID: Jai Mcneill is a 74 y.o. male who presents for Follow-up (6 mo fuv chol, discuss omega 3 not covered no other issues).    HPI     The patient saw his optometrist a few days ago.     The patient denies any changes in vision, hearing or dental.     The patient maintains they do not have any chest pain, chest tightness or shortness of breath.    They do not experience nausea, emesis, changes in bowel movements or dyspepsia.    The patient denies nocturia. They report urinating 1 and 2 times a night. The nocturia does not cause sleep disturbances.     The patient's colonoscopy is up to date.    The patient's vaccinations are up to date.      Review of Systems   Constitutional: Negative.    HENT: Negative.  Negative for dental problem and hearing loss.    Eyes: Negative.  Negative for visual disturbance.   Respiratory: Negative.  Negative for chest tightness and shortness of breath.    Cardiovascular: Negative.  Negative for chest pain.   Gastrointestinal: Negative.  Negative for constipation, diarrhea, nausea and vomiting.   Endocrine: Negative.    Genitourinary: Negative.    Musculoskeletal: Negative.    Skin: Negative.    Allergic/Immunologic: Negative.    Neurological: Negative.    Hematological: Negative.    Psychiatric/Behavioral: Negative.         Objective   /80   Pulse 75   Temp 36.4 °C (97.5 °F) (Temporal)   Ht 1.765 m (5' 9.5\")   Wt 81.9 kg (180 lb 9.6 oz)   SpO2 97%   BMI 26.29 kg/m²     Physical Exam  Constitutional:       Appearance: Normal appearance.   HENT:      Head: Normocephalic and atraumatic.      Nose: Nose normal.   Eyes:      Extraocular Movements: Extraocular movements intact.      Conjunctiva/sclera: Conjunctivae normal.      Pupils: Pupils are equal, round, and reactive to light.   Cardiovascular:      Rate and Rhythm: Normal rate and regular rhythm.      Pulses: Normal pulses.      Heart sounds: Normal heart sounds.   Pulmonary:      Effort: Pulmonary " effort is normal.      Breath sounds: Normal breath sounds and air entry.   Abdominal:      General: Bowel sounds are normal.      Palpations: Abdomen is soft.   Musculoskeletal:         General: Normal range of motion.      Cervical back: Normal range of motion.   Neurological:      Mental Status: He is alert.   Psychiatric:         Mood and Affect: Mood normal.         Behavior: Behavior normal.         Thought Content: Thought content normal.         Judgment: Judgment normal.         Assessment/Plan   Problem List Items Addressed This Visit             ICD-10-CM    Anxiety and depression F41.9, F32.A    Relevant Medications    PARoxetine (Paxil) 20 mg tablet    Ascending aortic aneurysm (CMS-HCC) I71.21    Elevated blood pressure, situational - Primary R03.0    Granulomatous disease (Multi) D71    Hyperlipidemia E78.5    Relevant Medications    atorvastatin (Lipitor) 40 mg tablet    Other Relevant Orders    Lipid Panel    Prostate cancer (Multi) C61    Medicare annual wellness visit, subsequent Z00.00    Relevant Orders    Follow Up In [facility] - [facility] - Medicare Annual          1. Elevated blood pressure, situational        2. Medicare annual wellness visit, subsequent  Follow Up In [facility] - PCP - [facility] [place]    Follow Up In [facility] - [facility] - Medicare Annual      3. Hyperlipidemia, unspecified hyperlipidemia type  atorvastatin (Lipitor) 40 mg tablet    Lipid Panel      4. Granulomatous disease (Multi)        5. Aneurysm of ascending aorta without rupture (CMS-HCC)        6. Prostate cancer (Multi)        7. Anxiety and depression  PARoxetine (Paxil) 20 mg tablet        1.  Elevated blood pressure    Repeat blood pressure more normal    At home your blood pressure has been normal    At this time I do not think you need to be treated for hypertension.  I do think you have whitecoat hypertension.    2.  Elevated cholesterol elevated triglycerides    Recent labs indicate  your cholesterol is in normal range and as well as your triglycerides.  Unfortunately your insurance company will not pay for the omega-3 fish oil prescription strength as according to their opinion your fasting triglycerides need to be greater than 500.  Years have been greater than 400 in the past but never greater than 500.  You have also been treated for this condition so there is no reason to check.    The reason why or lowering your triglycerides is not only to help reduce atherosclerotic arterial disease but also to reduce risk for pancreatitis.    Please stay on the atorvastatin 40 mg a day to help lower your cholesterol but this will also help mildly lower the triglycerides.  You report you are taking 2 omega-3 fish oil a day at this time.  You can stay on that.  I understand if it gets too expensive we can have you stop altogether we can have you try over-the-counter    Equally as important as your medications is eating a heart healthy diet a good goal 5-7 servings of fresh fruit vegetable every day along with lean protein avoiding simple sugars and fast foods    Keep walking keep exercising 30 minutes to 60 minutes 5 days a week is ideal certainly would call if any chest pains    We will contact you with your next set of labs    3.  Continue on the paroxetine to help with overall mood.  In addition continue getting at least 6 hours of sleep at night.  Continue with a healthy relationship with your family and friends.  Continue with daily prayer and meditation.  If you think your moods are changing or worsening please let me know    We will contact you with all the results of labs you will contact me if anything changes I will see you back in 6 months but am happy to see you sooner if needed    Follow-up in 6 months or sooner if there are any concerns.    Scribe Attestation  By signing my name below, Louise VANG Scribe   attest that this documentation has been prepared under the direction and in the  presence of Nabeel Elliott MD.    This note has been transcribed using a medical scribe and there is a possibility of unintentional typing misprints.

## 2024-07-05 NOTE — PATIENT INSTRUCTIONS
1.  Elevated blood pressure    Repeat blood pressure more normal    At home your blood pressure has been normal    At this time I do not think you need to be treated for hypertension.  I do think you have whitecoat hypertension.    2.  Elevated cholesterol elevated triglycerides    Recent labs indicate your cholesterol is in normal range and as well as your triglycerides.  Unfortunately your insurance company will not pay for the omega-3 fish oil prescription strength as according to their opinion your fasting triglycerides need to be greater than 500.  Years have been greater than 400 in the past but never greater than 500.  You have also been treated for this condition so there is no reason to check.    The reason why or lowering your triglycerides is not only to help reduce atherosclerotic arterial disease but also to reduce risk for pancreatitis.    Please stay on the atorvastatin 40 mg a day to help lower your cholesterol but this will also help mildly lower the triglycerides.  You report you are taking 2 omega-3 fish oil a day at this time.  You can stay on that.  I understand if it gets too expensive we can have you stop altogether we can have you try over-the-counter    Equally as important as your medications is eating a heart healthy diet a good goal 5-7 servings of fresh fruit vegetable every day along with lean protein avoiding simple sugars and fast foods    Keep walking keep exercising 30 minutes to 60 minutes 5 days a week is ideal certainly would call if any chest pains    We will contact you with your next set of labs    3.  Continue on the paroxetine to help with overall mood.  In addition continue getting at least 6 hours of sleep at night.  Continue with a healthy relationship with your family and friends.  Continue with daily prayer and meditation.  If you think your moods are changing or worsening please let me know    We will contact you with all the results of labs you will contact me if  anything changes I will see you back in 6 months but am happy to see you sooner if needed

## 2024-07-09 PROBLEM — F32.A ANXIETY AND DEPRESSION: Status: ACTIVE | Noted: 2023-02-14

## 2024-08-13 DIAGNOSIS — J45.20 INTERMITTENT ASTHMA, UNSPECIFIED ASTHMA SEVERITY, UNSPECIFIED WHETHER COMPLICATED (HHS-HCC): Primary | ICD-10-CM

## 2024-08-13 RX ORDER — ALBUTEROL SULFATE 90 UG/1
2 INHALANT RESPIRATORY (INHALATION) EVERY 4 HOURS PRN
Qty: 8.5 G | Refills: 3 | Status: SHIPPED | OUTPATIENT
Start: 2024-08-13 | End: 2025-08-13

## 2024-09-04 DIAGNOSIS — Z91.030 BEE STING ALLERGY: Primary | ICD-10-CM

## 2024-09-10 PROBLEM — Z85.46 HISTORY OF MALIGNANT NEOPLASM OF PROSTATE: Status: ACTIVE | Noted: 2024-09-10

## 2024-09-10 PROBLEM — R06.2 WHEEZING: Status: ACTIVE | Noted: 2024-09-10

## 2024-09-10 PROBLEM — S61.412A LACERATION OF LEFT HAND WITHOUT FOREIGN BODY: Status: RESOLVED | Noted: 2023-02-14 | Resolved: 2024-09-10

## 2024-09-10 PROBLEM — S76.011A STRAIN OF HIP FLEXOR, RIGHT, INITIAL ENCOUNTER: Status: RESOLVED | Noted: 2023-02-14 | Resolved: 2024-09-10

## 2024-09-10 PROBLEM — R05.9 COUGH: Status: ACTIVE | Noted: 2024-09-10

## 2024-09-10 PROBLEM — S93.402A LEFT ANKLE SPRAIN: Status: RESOLVED | Noted: 2023-02-14 | Resolved: 2024-09-10

## 2024-09-10 PROBLEM — J20.9 ACUTE BRONCHITIS: Status: ACTIVE | Noted: 2024-09-10

## 2024-09-10 PROBLEM — F41.9 ANXIETY AND DEPRESSION: Status: RESOLVED | Noted: 2023-02-14 | Resolved: 2024-09-10

## 2024-09-10 PROBLEM — N41.9 PROSTATITIS: Status: ACTIVE | Noted: 2024-09-10

## 2024-09-10 PROBLEM — F32.A ANXIETY AND DEPRESSION: Status: RESOLVED | Noted: 2023-02-14 | Resolved: 2024-09-10

## 2024-09-10 PROBLEM — M48.00 SPINAL STENOSIS: Status: ACTIVE | Noted: 2018-09-06

## 2024-09-11 ENCOUNTER — APPOINTMENT (OUTPATIENT)
Dept: CARDIOLOGY | Facility: CLINIC | Age: 74
End: 2024-09-11
Payer: MEDICARE

## 2024-09-26 ENCOUNTER — OFFICE VISIT (OUTPATIENT)
Dept: CARDIOLOGY | Facility: CLINIC | Age: 74
End: 2024-09-26
Payer: MEDICARE

## 2024-09-26 VITALS
WEIGHT: 181 LBS | SYSTOLIC BLOOD PRESSURE: 159 MMHG | DIASTOLIC BLOOD PRESSURE: 82 MMHG | HEIGHT: 69 IN | OXYGEN SATURATION: 96 % | BODY MASS INDEX: 26.81 KG/M2 | HEART RATE: 74 BPM

## 2024-09-26 DIAGNOSIS — I71.21 ANEURYSM OF ASCENDING AORTA WITHOUT RUPTURE (CMS-HCC): ICD-10-CM

## 2024-09-26 DIAGNOSIS — I25.10 CORONARY ARTERY DISEASE INVOLVING NATIVE CORONARY ARTERY OF NATIVE HEART WITHOUT ANGINA PECTORIS: Primary | ICD-10-CM

## 2024-09-26 PROBLEM — Z85.46 HISTORY OF MALIGNANT NEOPLASM OF PROSTATE: Status: RESOLVED | Noted: 2024-09-10 | Resolved: 2024-09-26

## 2024-09-26 PROBLEM — N41.9 PROSTATITIS: Status: RESOLVED | Noted: 2024-09-10 | Resolved: 2024-09-26

## 2024-09-26 PROBLEM — R05.9 COUGH: Status: RESOLVED | Noted: 2024-09-10 | Resolved: 2024-09-26

## 2024-09-26 PROBLEM — M25.551 HIP PAIN, RIGHT: Status: RESOLVED | Noted: 2023-02-14 | Resolved: 2024-09-26

## 2024-09-26 PROBLEM — D64.9 ANEMIA: Status: RESOLVED | Noted: 2023-11-15 | Resolved: 2024-09-26

## 2024-09-26 PROBLEM — R06.2 WHEEZING: Status: RESOLVED | Noted: 2024-09-10 | Resolved: 2024-09-26

## 2024-09-26 PROBLEM — R97.20 PSA ELEVATION: Status: RESOLVED | Noted: 2023-02-14 | Resolved: 2024-09-26

## 2024-09-26 PROBLEM — C61 PROSTATE CANCER (MULTI): Status: RESOLVED | Noted: 2023-02-14 | Resolved: 2024-09-26

## 2024-09-26 PROBLEM — J20.9 ACUTE BRONCHITIS: Status: RESOLVED | Noted: 2024-09-10 | Resolved: 2024-09-26

## 2024-09-26 PROBLEM — J30.9 ALLERGIC RHINITIS: Status: RESOLVED | Noted: 2023-02-14 | Resolved: 2024-09-26

## 2024-09-26 LAB
ATRIAL RATE: 73 BPM
P AXIS: 40 DEGREES
P OFFSET: 199 MS
P ONSET: 139 MS
PR INTERVAL: 168 MS
Q ONSET: 223 MS
QRS COUNT: 12 BEATS
QRS DURATION: 86 MS
QT INTERVAL: 384 MS
QTC CALCULATION(BAZETT): 423 MS
QTC FREDERICIA: 410 MS
R AXIS: 9 DEGREES
T AXIS: 124 DEGREES
T OFFSET: 415 MS
VENTRICULAR RATE: 73 BPM

## 2024-09-26 PROCEDURE — 93005 ELECTROCARDIOGRAM TRACING: CPT | Performed by: NURSE PRACTITIONER

## 2024-09-26 PROCEDURE — 1159F MED LIST DOCD IN RCRD: CPT | Performed by: NURSE PRACTITIONER

## 2024-09-26 PROCEDURE — 3079F DIAST BP 80-89 MM HG: CPT | Performed by: NURSE PRACTITIONER

## 2024-09-26 PROCEDURE — 1126F AMNT PAIN NOTED NONE PRSNT: CPT | Performed by: NURSE PRACTITIONER

## 2024-09-26 PROCEDURE — 93010 ELECTROCARDIOGRAM REPORT: CPT | Performed by: INTERNAL MEDICINE

## 2024-09-26 PROCEDURE — 99212 OFFICE O/P EST SF 10 MIN: CPT | Performed by: NURSE PRACTITIONER

## 2024-09-26 PROCEDURE — 3077F SYST BP >= 140 MM HG: CPT | Performed by: NURSE PRACTITIONER

## 2024-09-26 PROCEDURE — 1123F ACP DISCUSS/DSCN MKR DOCD: CPT | Performed by: NURSE PRACTITIONER

## 2024-09-26 ASSESSMENT — ENCOUNTER SYMPTOMS
OCCASIONAL FEELINGS OF UNSTEADINESS: 0
LOSS OF SENSATION IN FEET: 0
DEPRESSION: 0

## 2024-09-26 ASSESSMENT — PAIN SCALES - GENERAL: PAINLEVEL: 0-NO PAIN

## 2024-09-26 ASSESSMENT — PATIENT HEALTH QUESTIONNAIRE - PHQ9
1. LITTLE INTEREST OR PLEASURE IN DOING THINGS: NOT AT ALL
SUM OF ALL RESPONSES TO PHQ9 QUESTIONS 1 AND 2: 0
2. FEELING DOWN, DEPRESSED OR HOPELESS: NOT AT ALL

## 2024-09-26 NOTE — PROGRESS NOTES
Jai Mcneill is a 74 y.o. male     History Of Present Illness   Mr Mcneill is a 74 year old male with hypertension, hyperlipidemia, prostate cancer, left rotator cuff repair, cataract surgery, here to follow up on his testing. He previously underwent a CT calcium score. He denies any complaints of chest pain, shortness of breath, palpitations, lower extremity edema, dizziness, or syncope.     He denies any H/O CAD, MI, CHF, CVA, TIA, CKD OR DM, BLEEDING OR CLOTTING DISORDERS.     He never smoked     He worked as a , retired now     FAMILY MEDICAL HISTORY:  FATHER SMOKER, LUNG CANCER, COPD, PROSTATE CANCER  MOTHER UNKNOWN,  AFTER A FALL  BROTHER HEALTHY  GRANDPARENTS UNKNOWN       Social HX  Social History     Tobacco Use    Smoking status: Never     Passive exposure: Past    Smokeless tobacco: Never   Substance Use Topics    Alcohol use: Yes     Alcohol/week: 20.0 standard drinks of alcohol     Types: 20 Cans of beer per week    Drug use: Never          Family HX  Family History   Problem Relation Name Age of Onset    Lung cancer Father            Review Of Systems   Constitutional: not feeling tired.   Eyes: no eyesight problems.  No vision loss or change in vision  ENT: no hearing loss and no nosebleeds.   Cardiovascular: No intermittent leg claudication,   No chest pain  No chest tightness   Np  heavy pressure  No shortness of breath,  No palpitations,  No lower extremity edema  The heart rate is regular  Respiratory: no chronic cough   No shortness of breath.   Gastrointestinal: no change in bowel habits and no blood in stools.   Genitourinary: no urinary frequency.   Skin: no skin rashes.   Neurological: No frequent falls.   No dizziness  No weakness  Denies headaches  Psychiatric: no depression and not suicidal.   Musculoskeletal: No joint pain  No Myalgia pain       Allergies  Allergies   Allergen Reactions    Ragweed Pollen Runny nose          Vitals  There were no vitals taken for  this visit.        Physical Exam  Constitutional: alert and in no acute distress.   Eyes: no erythema, swelling or discharge from the eye .   Neck: neck is supple, symmetric, trachea midline, no masses  and no thyromegaly .   Pulmonary: No increased work of breathing or signs of respiratory distress    Lungs clear to auscultation.    No friction rub.  Cardiovascular: carotid pulses 2+ bilaterally with no bruit    JVP was normal, no thrills ,   Regular rhythm, normal S1 and S2, no murmurs    Pedal pulses 2+ bilaterally    No edema .   Abdomen: abdomen non-tender, no masses  and no hepatomegaly . No pulsatile mass noted  Skin: skin warm and dry, normal skin turgor .   Psychiatric judgment and insight is normal  and oriented to person, place and time .            Current/Home Meds    Current Outpatient Medications:     albuterol (ProAir HFA) 90 mcg/actuation inhaler, Inhale 2 puffs every 4 hours if needed for wheezing or shortness of breath., Disp: 8.5 g, Rfl: 3    atorvastatin (Lipitor) 40 mg tablet, Take 1 tablet (40 mg) by mouth once every day., Disp: 90 tablet, Rfl: 3    cholecalciferol (Vitamin D-3) 25 MCG (1000 UT) tablet, Take 1 tablet (1,000 Units) by mouth once every day., Disp: , Rfl:     cyanocobalamin (Vitamin B-12) 250 mcg tablet, Take 1 tablet (250 mcg) by mouth once daily., Disp: , Rfl:     [id number] (Macular Health Formula) 5-1-7.5 mg capsule, Take by mouth., Disp: , Rfl:     omega-3 acid ethyl esters (Lovaza) 1 gram capsule, Take 2 capsules (2 g) by mouth 2 times a day., Disp: 360 capsule, Rfl: 3    PARoxetine (Paxil) 20 mg tablet, Take 1 tablet (20 mg) by mouth once every day., Disp: 90 tablet, Rfl: 3       Labs           EKG Findings  EKG: Normal sinus rhythm  Minimal voltage criteria for LVH, may be normal variant  ST & T wave abnormality, consider lateral ischemia  Abnormal ECG        Cardiac Service Results:  2022 Ct CALCIUM SCAN  LM: 75.5.  LAD: 90.4.  LCx: 0.  RCA: 5.96.    "  Total: 172.     The ascending aorta is slightly prominent and measured at 4 cm in  diameter.         Assessment/Plan      DILATED ASCENDING AORTIC ANEURYSM:  Most recent CT scan shows a dilated ascending aorta measuring 4.1cm.  Will have him under go an echo prior to his follow up.  We discussed the importance of tight BP control.    HYPERTENSION:  Patients BP today is 159/82.  He states he has \"white coat syndrome.\"  His home Bps have been very well managed.  He is not currently on any antihypertensive.  He is to monitor his BP at home 3x a week and bring these readings with him to his next appt     "

## 2024-11-25 ENCOUNTER — APPOINTMENT (OUTPATIENT)
Dept: ALLERGY | Facility: CLINIC | Age: 74
End: 2024-11-25
Payer: MEDICARE

## 2024-12-13 ENCOUNTER — LAB (OUTPATIENT)
Dept: LAB | Facility: LAB | Age: 74
End: 2024-12-13
Payer: MEDICARE

## 2024-12-13 DIAGNOSIS — E78.5 HYPERLIPIDEMIA, UNSPECIFIED HYPERLIPIDEMIA TYPE: ICD-10-CM

## 2024-12-13 LAB
CHOLEST SERPL-MCNC: 185 MG/DL (ref 0–199)
CHOLESTEROL/HDL RATIO: 2.7
HDLC SERPL-MCNC: 68.8 MG/DL
LDLC SERPL CALC-MCNC: 53 MG/DL
NON HDL CHOLESTEROL: 116 MG/DL (ref 0–149)
TRIGL SERPL-MCNC: 316 MG/DL (ref 0–149)
VLDL: 63 MG/DL (ref 0–40)

## 2024-12-13 PROCEDURE — 36415 COLL VENOUS BLD VENIPUNCTURE: CPT

## 2024-12-13 PROCEDURE — 80061 LIPID PANEL: CPT

## 2024-12-18 DIAGNOSIS — Z00.00 WELLNESS EXAMINATION: ICD-10-CM

## 2024-12-18 DIAGNOSIS — E78.2 MIXED HYPERLIPIDEMIA: Primary | ICD-10-CM

## 2024-12-18 DIAGNOSIS — R73.01 ELEVATED FASTING BLOOD SUGAR: ICD-10-CM

## 2024-12-18 DIAGNOSIS — E55.9 VITAMIN D DEFICIENCY: ICD-10-CM

## 2024-12-18 DIAGNOSIS — Z12.5 PROSTATE CANCER SCREENING: ICD-10-CM

## 2024-12-18 DIAGNOSIS — C61 PROSTATE CANCER (MULTI): ICD-10-CM

## 2024-12-18 DIAGNOSIS — D64.9 ANEMIA, UNSPECIFIED TYPE: ICD-10-CM

## 2025-02-12 LAB — PSA SERPL-MCNC: 0.22 NG/ML

## 2025-02-14 ENCOUNTER — APPOINTMENT (OUTPATIENT)
Dept: PRIMARY CARE | Facility: CLINIC | Age: 75
End: 2025-02-14
Payer: MEDICARE

## 2025-02-14 VITALS
BODY MASS INDEX: 27.28 KG/M2 | OXYGEN SATURATION: 98 % | DIASTOLIC BLOOD PRESSURE: 68 MMHG | HEART RATE: 74 BPM | WEIGHT: 184.2 LBS | HEIGHT: 69 IN | SYSTOLIC BLOOD PRESSURE: 130 MMHG | TEMPERATURE: 97.6 F

## 2025-02-14 DIAGNOSIS — D64.9 ANEMIA, UNSPECIFIED TYPE: ICD-10-CM

## 2025-02-14 DIAGNOSIS — Z85.46 HISTORY OF MALIGNANT NEOPLASM OF PROSTATE: ICD-10-CM

## 2025-02-14 DIAGNOSIS — C61 PROSTATE CANCER (MULTI): ICD-10-CM

## 2025-02-14 DIAGNOSIS — R73.01 ELEVATED FASTING BLOOD SUGAR: ICD-10-CM

## 2025-02-14 DIAGNOSIS — Z00.00 MEDICARE ANNUAL WELLNESS VISIT, SUBSEQUENT: Primary | ICD-10-CM

## 2025-02-14 DIAGNOSIS — E55.9 VITAMIN D DEFICIENCY: ICD-10-CM

## 2025-02-14 DIAGNOSIS — E78.2 MIXED HYPERLIPIDEMIA: ICD-10-CM

## 2025-02-14 DIAGNOSIS — I71.21 ANEURYSM OF ASCENDING AORTA WITHOUT RUPTURE (CMS-HCC): ICD-10-CM

## 2025-02-14 DIAGNOSIS — R03.0 ELEVATED BLOOD PRESSURE, SITUATIONAL: ICD-10-CM

## 2025-02-14 DIAGNOSIS — Z12.11 ENCOUNTER FOR SCREENING FOR MALIGNANT NEOPLASM OF COLON: ICD-10-CM

## 2025-02-14 DIAGNOSIS — R01.1 MURMUR: ICD-10-CM

## 2025-02-14 PROCEDURE — 3078F DIAST BP <80 MM HG: CPT | Performed by: FAMILY MEDICINE

## 2025-02-14 PROCEDURE — G0439 PPPS, SUBSEQ VISIT: HCPCS | Performed by: FAMILY MEDICINE

## 2025-02-14 PROCEDURE — 1160F RVW MEDS BY RX/DR IN RCRD: CPT | Performed by: FAMILY MEDICINE

## 2025-02-14 PROCEDURE — 3075F SYST BP GE 130 - 139MM HG: CPT | Performed by: FAMILY MEDICINE

## 2025-02-14 PROCEDURE — 99213 OFFICE O/P EST LOW 20 MIN: CPT | Performed by: FAMILY MEDICINE

## 2025-02-14 PROCEDURE — 1159F MED LIST DOCD IN RCRD: CPT | Performed by: FAMILY MEDICINE

## 2025-02-14 PROCEDURE — 1170F FXNL STATUS ASSESSED: CPT | Performed by: FAMILY MEDICINE

## 2025-02-14 PROCEDURE — 1123F ACP DISCUSS/DSCN MKR DOCD: CPT | Performed by: FAMILY MEDICINE

## 2025-02-14 ASSESSMENT — ANXIETY QUESTIONNAIRES
2. NOT BEING ABLE TO STOP OR CONTROL WORRYING: NOT AT ALL
5. BEING SO RESTLESS THAT IT IS HARD TO SIT STILL: NOT AT ALL
7. FEELING AFRAID AS IF SOMETHING AWFUL MIGHT HAPPEN: NOT AT ALL
6. BECOMING EASILY ANNOYED OR IRRITABLE: NOT AT ALL
1. FEELING NERVOUS, ANXIOUS, OR ON EDGE: NOT AT ALL
3. WORRYING TOO MUCH ABOUT DIFFERENT THINGS: NOT AT ALL
4. TROUBLE RELAXING: NOT AT ALL
IF YOU CHECKED OFF ANY PROBLEMS ON THIS QUESTIONNAIRE, HOW DIFFICULT HAVE THESE PROBLEMS MADE IT FOR YOU TO DO YOUR WORK, TAKE CARE OF THINGS AT HOME, OR GET ALONG WITH OTHER PEOPLE: NOT DIFFICULT AT ALL
GAD7 TOTAL SCORE: 0

## 2025-02-14 ASSESSMENT — PATIENT HEALTH QUESTIONNAIRE - PHQ9
2. FEELING DOWN, DEPRESSED OR HOPELESS: NOT AT ALL
1. LITTLE INTEREST OR PLEASURE IN DOING THINGS: NOT AT ALL
SUM OF ALL RESPONSES TO PHQ9 QUESTIONS 1 AND 2: 0
SUM OF ALL RESPONSES TO PHQ9 QUESTIONS 1 AND 2: 0
2. FEELING DOWN, DEPRESSED OR HOPELESS: NOT AT ALL
1. LITTLE INTEREST OR PLEASURE IN DOING THINGS: NOT AT ALL

## 2025-02-14 ASSESSMENT — ACTIVITIES OF DAILY LIVING (ADL)
MANAGING_FINANCES: INDEPENDENT
BATHING: INDEPENDENT
TAKING_MEDICATION: INDEPENDENT
DRESSING: INDEPENDENT
GROCERY_SHOPPING: INDEPENDENT
DOING_HOUSEWORK: INDEPENDENT

## 2025-02-14 NOTE — PATIENT INSTRUCTIONS
1.  Medicare wellness visit    Today in the office you had your annual wellness visit    Please have a colonoscopy this year for colon cancer screening we will place the order    Consider getting an RSV vaccine which can be done at the pharmacy you could also get the new shingles vaccine.  Otherwise you are up-to-date with the flu shot and the pneumonia vaccines and the COVID booster    Recent labs indicate the PSA is low normal.    Recent labs also indicate cholesterol is well-controlled triglycerides mildly elevated    Keep eating a heart healthy diet a good goal 5-7 servings of fresh fruit vegetable daily along with lean protein avoid the simple sugars avoid the fast foods avoid the processed carbs    Keep walking keep exercising keep riding your bike 30 minutes 5 days a week is ideal certainly would call me if he had chest pains with activities    When I listen to your heart today I hear a murmur we also know on the CAT scan it shows a very small 4.1 cm ascending thoracic aneurysm.  Therefore I am recommending an echocardiogram to be done we will contact you once I see those results    Continue on the paroxetine 20 mg a day to help with overall mood.  Continue with a healthy relationship with your family and friends.  Try to be outside or be in a window for 30 minutes of natural sunlight and continue with daily prayer and meditation if you think your moods are worsening please call  You have a very small skin abnormal lesion on the right upper eyebrow that I would like you to be evaluated by the dermatologist as I have concerns this could be a basal cell carcinoma.  If you otherwise stay healthy I will see you back in 6 months if you need refills between now and then please let me know please have fasting labs which will include kidneys liver blood sugar cholesterol thyroid vitamin D before I see you at your next follow-up appointment

## 2025-02-14 NOTE — PROGRESS NOTES
Subjective   Patient ID: Jai Mcneill is a 74 y.o. male who presents for Medicare Annual Wellness Visit Subsequent ([facility]) and Follow-up (6mo fuv anxiety/depression, chol, no issues or complaints).      HPI     Hyperlipidemia: The patient is presenting today for a follow up of hyperlipidemia. The patient recent blood work shows elevated but stable from previous visit of lipid labs (please see attached labs in the note). The patient is trying to eat a heart healthy diet keeping in mind to eat healthy fats and to avoid fried foods, fatty foods that may elevate their levels. The patient has not been hospitalized for this in the last 6 months. Current medications used are atorvastatin. The patient is compliant with medications. Patient denies any side effects to the medications.     The patient mentions some gastrointestinal concerns which occurred earlier this week.     The patient is trying to stay active and healthy. They are currently exercising and remaining physically active. The aare maintaining a heathy diet that includes green leafy vegetables, fruits and proteins. They arestaying well hydrated.    The patient denies any changes in vision, hearing or dental.     The patient maintains they do not have any chest pain, chest tightness or shortness of breath.    They do not experience nausea, emesis, changes in bowel movements or dyspepsia.    The patient denies changes in or worsening of moods.    The patient denies nocturia. They report urinating 1 times a night. The nocturia does not cause sleep disturbances.     The patient denies any issues with erections.    The patient's colonoscopy is up to date.     The patient's vaccinations are up to date.    Review of Systems   Constitutional: Negative.    HENT: Negative.  Negative for dental problem and hearing loss.    Eyes: Negative.  Negative for visual disturbance.   Respiratory: Negative.  Negative for chest tightness and shortness of breath.    Cardiovascular:  "Negative.  Negative for chest pain.   Gastrointestinal: Negative.  Negative for constipation, diarrhea, nausea and vomiting.   Endocrine: Negative.    Genitourinary: Negative.    Musculoskeletal: Negative.    Skin: Negative.    Allergic/Immunologic: Negative.    Neurological: Negative.    Hematological: Negative.    Psychiatric/Behavioral: Negative.     14/14 systems reviewed and negative other than what is listed in the history of present illness     Objective   /68 (BP Location: Left arm, Patient Position: Sitting, BP Cuff Size: Adult)   Pulse 74   Temp 36.4 °C (97.6 °F) (Temporal)   Ht 1.753 m (5' 9\")   Wt 83.6 kg (184 lb 3.2 oz)   SpO2 98%   BMI 27.20 kg/m²     Physical Exam    Physical Exam  Constitutional:       Appearance: Normal appearance.   HENT:      Head: Normocephalic and atraumatic.      Nose: Nose normal.   Eyes:      Extraocular Movements: Extraocular movements intact.      Conjunctiva/sclera: Conjunctivae normal.      Pupils: Pupils are equal, round, and reactive to light.   Cardiovascular:      Rate and Rhythm: Normal rate and regular rhythm.      Pulses: Normal pulses.      Heart sounds: Murmur heard.   Pulmonary:      Effort: Pulmonary effort is normal.      Breath sounds: Normal breath sounds and air entry.   Abdominal:      General: Bowel sounds are normal.      Palpations: Abdomen is soft.   Musculoskeletal:         General: Normal range of motion.      Cervical back: Normal range of motion.   Neurological:      Mental Status: He is alert.   Psychiatric:         Mood and Affect: Mood normal.         Behavior: Behavior normal.         Thought Content: Thought content normal.         Judgment: Judgment normal.           Assessment/Plan     1. Medicare annual wellness visit, subsequent  Follow Up In [facility] - PCP - Medicare Annual      2. Mixed hyperlipidemia  TSH with reflex to Free T4 if abnormal    Lipid Panel    Comprehensive Metabolic Panel      3. Aneurysm of " ascending aorta without rupture (CMS-HCC)        4. Prostate cancer (Multi)  PSA      5. Elevated fasting blood sugar  Hemoglobin A1C    CBC and Auto Differential      6. Anemia, unspecified type  CBC and Auto Differential      7. Encounter for screening for malignant neoplasm of colon  Colonoscopy Screening; Average Risk Patient      8. Murmur  Transthoracic Echo Complete      9. Elevated blood pressure, situational        10. Vitamin D deficiency        11. History of malignant neoplasm of prostate          1.  Medicare wellness visit    Today in the office you had your annual wellness visit    Please have a colonoscopy this year for colon cancer screening we will place the order    Consider getting an RSV vaccine which can be done at the pharmacy you could also get the new shingles vaccine.  Otherwise you are up-to-date with the flu shot and the pneumonia vaccines and the COVID booster    Recent labs indicate the PSA is low normal.    Recent labs also indicate cholesterol is well-controlled triglycerides mildly elevated    Keep eating a heart healthy diet a good goal 5-7 servings of fresh fruit vegetable daily along with lean protein avoid the simple sugars avoid the fast foods avoid the processed carbs    Keep walking keep exercising keep riding your bike 30 minutes 5 days a week is ideal certainly would call me if he had chest pains with activities    When I listen to your heart today I hear a murmur we also know on the CAT scan it shows a very small 4.1 cm ascending thoracic aneurysm.  Therefore I am recommending an echocardiogram to be done we will contact you once I see those results    Continue on the paroxetine 20 mg a day to help with overall mood.  Continue with a healthy relationship with your family and friends.  Try to be outside or be in a window for 30 minutes of natural sunlight and continue with daily prayer and meditation if you think your moods are worsening please call  You have a very small  skin abnormal lesion on the right upper eyebrow that I would like you to be evaluated by the dermatologist as I have concerns this could be a basal cell carcinoma.  If you otherwise stay healthy I will see you back in 6 months if you need refills between now and then please let me know please have fasting labs which will include kidneys liver blood sugar cholesterol thyroid vitamin D before I see you at your next follow-up appointment    Follow-up in 6 months or sooner if there are any concerns.    Scribe Attestation  By signing my name below, I, Noe Arguelles   attest that this documentation has been prepared under the direction and in the presence of Nabeel Elliott MD.    This note has been transcribed using a medical scribe and there is a possibility of unintentional typing misprints.

## 2025-02-17 PROBLEM — R73.01 ELEVATED FASTING BLOOD SUGAR: Status: ACTIVE | Noted: 2025-02-17

## 2025-02-18 DIAGNOSIS — C61 PROSTATE CANCER (MULTI): ICD-10-CM

## 2025-02-18 DIAGNOSIS — R73.01 ELEVATED FASTING BLOOD SUGAR: ICD-10-CM

## 2025-02-18 DIAGNOSIS — E78.2 MIXED HYPERLIPIDEMIA: ICD-10-CM

## 2025-02-18 DIAGNOSIS — D64.9 ANEMIA, UNSPECIFIED TYPE: ICD-10-CM

## 2025-02-25 ENCOUNTER — HOSPITAL ENCOUNTER (OUTPATIENT)
Dept: CARDIOLOGY | Facility: CLINIC | Age: 75
Discharge: HOME | End: 2025-02-25
Payer: MEDICARE

## 2025-02-25 DIAGNOSIS — R01.1 MURMUR: ICD-10-CM

## 2025-02-25 PROCEDURE — 93306 TTE W/DOPPLER COMPLETE: CPT | Performed by: STUDENT IN AN ORGANIZED HEALTH CARE EDUCATION/TRAINING PROGRAM

## 2025-02-25 PROCEDURE — 93306 TTE W/DOPPLER COMPLETE: CPT

## 2025-02-28 LAB
AORTIC VALVE PEAK VELOCITY: 1.71 M/S
AV PEAK GRADIENT: 12 MMHG
AVA (PEAK VEL): 3.02 CM2
EJECTION FRACTION APICAL 4 CHAMBER: 68.2
EJECTION FRACTION: 68 %
LEFT ATRIUM VOLUME AREA LENGTH INDEX BSA: 28.9 ML/M2
LEFT VENTRICLE INTERNAL DIMENSION DIASTOLE: 4.76 CM (ref 3.5–6)
LEFT VENTRICULAR OUTFLOW TRACT DIAMETER: 2.26 CM
MITRAL VALVE E/A RATIO: 0.87
RIGHT VENTRICLE FREE WALL PEAK S': 18 CM/S
RIGHT VENTRICLE PEAK SYSTOLIC PRESSURE: 33.6 MMHG
TRICUSPID ANNULAR PLANE SYSTOLIC EXCURSION: 2.5 CM

## 2025-03-25 DIAGNOSIS — R03.0 ELEVATED BLOOD PRESSURE, SITUATIONAL: ICD-10-CM

## 2025-03-25 DIAGNOSIS — I71.21 ANEURYSM OF ASCENDING AORTA WITHOUT RUPTURE: ICD-10-CM

## 2025-03-25 DIAGNOSIS — I10 PRIMARY HYPERTENSION: Primary | ICD-10-CM

## 2025-03-25 RX ORDER — ATENOLOL 25 MG/1
25 TABLET ORAL DAILY
Qty: 30 TABLET | Refills: 11 | Status: SHIPPED | OUTPATIENT
Start: 2025-03-25 | End: 2026-03-25

## 2025-04-15 ENCOUNTER — OFFICE (OUTPATIENT)
Dept: URBAN - METROPOLITAN AREA CLINIC 26 | Facility: CLINIC | Age: 75
End: 2025-04-15
Payer: MEDICARE

## 2025-04-15 VITALS
SYSTOLIC BLOOD PRESSURE: 152 MMHG | WEIGHT: 182 LBS | HEIGHT: 69 IN | DIASTOLIC BLOOD PRESSURE: 71 MMHG | HEART RATE: 61 BPM | TEMPERATURE: 98.1 F | SYSTOLIC BLOOD PRESSURE: 135 MMHG | DIASTOLIC BLOOD PRESSURE: 70 MMHG

## 2025-04-15 DIAGNOSIS — Z86.0100 PERSONAL HISTORY OF COLON POLYPS, UNSPECIFIED: ICD-10-CM

## 2025-04-15 PROCEDURE — 99203 OFFICE O/P NEW LOW 30 MIN: CPT | Performed by: INTERNAL MEDICINE

## 2025-08-01 DIAGNOSIS — F32.A ANXIETY AND DEPRESSION: ICD-10-CM

## 2025-08-01 DIAGNOSIS — F41.9 ANXIETY AND DEPRESSION: ICD-10-CM

## 2025-08-01 DIAGNOSIS — E78.5 HYPERLIPIDEMIA, UNSPECIFIED HYPERLIPIDEMIA TYPE: ICD-10-CM

## 2025-08-01 RX ORDER — PAROXETINE 20 MG/1
20 TABLET, FILM COATED ORAL
Qty: 90 TABLET | Refills: 3 | Status: SHIPPED | OUTPATIENT
Start: 2025-08-01

## 2025-08-01 RX ORDER — ATORVASTATIN CALCIUM 40 MG/1
40 TABLET, FILM COATED ORAL
Qty: 90 TABLET | Refills: 3 | Status: SHIPPED | OUTPATIENT
Start: 2025-08-01

## 2025-08-15 LAB
ALBUMIN SERPL-MCNC: 4.5 G/DL (ref 3.6–5.1)
ALP SERPL-CCNC: 55 U/L (ref 35–144)
ALT SERPL-CCNC: 17 U/L (ref 9–46)
ANION GAP SERPL CALCULATED.4IONS-SCNC: 10 MMOL/L (CALC) (ref 7–17)
AST SERPL-CCNC: 20 U/L (ref 10–35)
BASOPHILS # BLD AUTO: 48 CELLS/UL (ref 0–200)
BASOPHILS NFR BLD AUTO: 1.1 %
BILIRUB SERPL-MCNC: 0.9 MG/DL (ref 0.2–1.2)
BUN SERPL-MCNC: 29 MG/DL (ref 7–25)
CALCIUM SERPL-MCNC: 9.5 MG/DL (ref 8.6–10.3)
CHLORIDE SERPL-SCNC: 102 MMOL/L (ref 98–110)
CHOLEST SERPL-MCNC: 217 MG/DL
CHOLEST/HDLC SERPL: 3.1 (CALC)
CO2 SERPL-SCNC: 27 MMOL/L (ref 20–32)
CREAT SERPL-MCNC: 0.91 MG/DL (ref 0.7–1.28)
EGFRCR SERPLBLD CKD-EPI 2021: 87 ML/MIN/1.73M2
EOSINOPHIL # BLD AUTO: 110 CELLS/UL (ref 15–500)
EOSINOPHIL NFR BLD AUTO: 2.5 %
ERYTHROCYTE [DISTWIDTH] IN BLOOD BY AUTOMATED COUNT: 12.1 % (ref 11–15)
EST. AVERAGE GLUCOSE BLD GHB EST-MCNC: 126 MG/DL
EST. AVERAGE GLUCOSE BLD GHB EST-SCNC: 7 MMOL/L
GLUCOSE SERPL-MCNC: 113 MG/DL (ref 65–99)
HBA1C MFR BLD: 6 %
HCT VFR BLD AUTO: 41.5 % (ref 38.5–50)
HDLC SERPL-MCNC: 71 MG/DL
HGB BLD-MCNC: 14 G/DL (ref 13.2–17.1)
LDLC SERPL CALC-MCNC: 97 MG/DL (CALC)
LYMPHOCYTES # BLD AUTO: 1030 CELLS/UL (ref 850–3900)
LYMPHOCYTES NFR BLD AUTO: 23.4 %
MCH RBC QN AUTO: 32.1 PG (ref 27–33)
MCHC RBC AUTO-ENTMCNC: 33.7 G/DL (ref 32–36)
MCV RBC AUTO: 95.2 FL (ref 80–100)
MONOCYTES # BLD AUTO: 418 CELLS/UL (ref 200–950)
MONOCYTES NFR BLD AUTO: 9.5 %
NEUTROPHILS # BLD AUTO: 2794 CELLS/UL (ref 1500–7800)
NEUTROPHILS NFR BLD AUTO: 63.5 %
NONHDLC SERPL-MCNC: 146 MG/DL (CALC)
PLATELET # BLD AUTO: 171 THOUSAND/UL (ref 140–400)
PMV BLD REES-ECKER: 10.5 FL (ref 7.5–12.5)
POTASSIUM SERPL-SCNC: 4.4 MMOL/L (ref 3.5–5.3)
PROT SERPL-MCNC: 6.8 G/DL (ref 6.1–8.1)
PSA SERPL-MCNC: 0.3 NG/ML
RBC # BLD AUTO: 4.36 MILLION/UL (ref 4.2–5.8)
SODIUM SERPL-SCNC: 139 MMOL/L (ref 135–146)
TRIGL SERPL-MCNC: 364 MG/DL
TSH SERPL-ACNC: 3.32 MIU/L (ref 0.4–4.5)
WBC # BLD AUTO: 4.4 THOUSAND/UL (ref 3.8–10.8)

## 2025-08-25 ENCOUNTER — APPOINTMENT (OUTPATIENT)
Dept: PRIMARY CARE | Facility: CLINIC | Age: 75
End: 2025-08-25
Payer: MEDICARE

## 2025-08-25 VITALS
TEMPERATURE: 97.5 F | BODY MASS INDEX: 27.01 KG/M2 | DIASTOLIC BLOOD PRESSURE: 70 MMHG | WEIGHT: 182.9 LBS | SYSTOLIC BLOOD PRESSURE: 135 MMHG | HEART RATE: 63 BPM | OXYGEN SATURATION: 96 %

## 2025-08-25 DIAGNOSIS — R73.01 ELEVATED FASTING BLOOD SUGAR: ICD-10-CM

## 2025-08-25 DIAGNOSIS — I71.21 ANEURYSM OF ASCENDING AORTA WITHOUT RUPTURE: Primary | ICD-10-CM

## 2025-08-25 DIAGNOSIS — Z85.46 HISTORY OF MALIGNANT NEOPLASM OF PROSTATE: ICD-10-CM

## 2025-08-25 DIAGNOSIS — F41.9 ANXIETY: ICD-10-CM

## 2025-08-25 DIAGNOSIS — I10 PRIMARY HYPERTENSION: ICD-10-CM

## 2025-08-25 DIAGNOSIS — R73.03 PREDIABETES: ICD-10-CM

## 2025-08-25 DIAGNOSIS — J98.4 CALCIFIED GRANULOMA OF LUNG: ICD-10-CM

## 2025-08-25 DIAGNOSIS — E78.2 MIXED HYPERLIPIDEMIA: ICD-10-CM

## 2025-08-25 DIAGNOSIS — J30.1 ALLERGIC RHINITIS DUE TO POLLEN, UNSPECIFIED SEASONALITY: ICD-10-CM

## 2025-08-25 PROBLEM — D71 GRANULOMATOUS DISEASE (MULTI): Status: RESOLVED | Noted: 2023-02-14 | Resolved: 2025-08-25

## 2025-08-25 PROCEDURE — G2211 COMPLEX E/M VISIT ADD ON: HCPCS | Performed by: FAMILY MEDICINE

## 2025-08-25 PROCEDURE — 1160F RVW MEDS BY RX/DR IN RCRD: CPT | Performed by: FAMILY MEDICINE

## 2025-08-25 PROCEDURE — 99214 OFFICE O/P EST MOD 30 MIN: CPT | Performed by: FAMILY MEDICINE

## 2025-08-25 PROCEDURE — 1159F MED LIST DOCD IN RCRD: CPT | Performed by: FAMILY MEDICINE

## 2025-08-25 PROCEDURE — 3075F SYST BP GE 130 - 139MM HG: CPT | Performed by: FAMILY MEDICINE

## 2025-08-25 PROCEDURE — 3078F DIAST BP <80 MM HG: CPT | Performed by: FAMILY MEDICINE

## 2025-08-25 RX ORDER — ATENOLOL 25 MG/1
12.5 TABLET ORAL DAILY
Start: 2025-08-25 | End: 2026-08-25

## 2025-08-25 ASSESSMENT — ANXIETY QUESTIONNAIRES
GAD7 TOTAL SCORE: 0
5. BEING SO RESTLESS THAT IT IS HARD TO SIT STILL: NOT AT ALL
4. TROUBLE RELAXING: NOT AT ALL
3. WORRYING TOO MUCH ABOUT DIFFERENT THINGS: NOT AT ALL
IF YOU CHECKED OFF ANY PROBLEMS ON THIS QUESTIONNAIRE, HOW DIFFICULT HAVE THESE PROBLEMS MADE IT FOR YOU TO DO YOUR WORK, TAKE CARE OF THINGS AT HOME, OR GET ALONG WITH OTHER PEOPLE: NOT DIFFICULT AT ALL
2. NOT BEING ABLE TO STOP OR CONTROL WORRYING: NOT AT ALL
6. BECOMING EASILY ANNOYED OR IRRITABLE: NOT AT ALL
1. FEELING NERVOUS, ANXIOUS, OR ON EDGE: NOT AT ALL
7. FEELING AFRAID AS IF SOMETHING AWFUL MIGHT HAPPEN: NOT AT ALL

## 2025-08-25 ASSESSMENT — PATIENT HEALTH QUESTIONNAIRE - PHQ9
SUM OF ALL RESPONSES TO PHQ9 QUESTIONS 1 AND 2: 0
1. LITTLE INTEREST OR PLEASURE IN DOING THINGS: NOT AT ALL
2. FEELING DOWN, DEPRESSED OR HOPELESS: NOT AT ALL

## 2025-08-26 PROBLEM — R73.03 PREDIABETES: Status: ACTIVE | Noted: 2025-08-26

## 2025-08-26 PROBLEM — I10 PRIMARY HYPERTENSION: Status: ACTIVE | Noted: 2025-08-26

## 2025-09-02 ENCOUNTER — APPOINTMENT (OUTPATIENT)
Dept: CARDIOLOGY | Facility: CLINIC | Age: 75
End: 2025-09-02
Payer: MEDICARE

## 2026-02-10 ENCOUNTER — APPOINTMENT (OUTPATIENT)
Dept: PRIMARY CARE | Facility: CLINIC | Age: 76
End: 2026-02-10
Payer: MEDICARE